# Patient Record
Sex: MALE | Race: WHITE | NOT HISPANIC OR LATINO | Employment: OTHER | URBAN - METROPOLITAN AREA
[De-identification: names, ages, dates, MRNs, and addresses within clinical notes are randomized per-mention and may not be internally consistent; named-entity substitution may affect disease eponyms.]

---

## 2017-05-20 ENCOUNTER — APPOINTMENT (OUTPATIENT)
Dept: RADIOLOGY | Facility: MEDICAL CENTER | Age: 67
End: 2017-05-20
Attending: INTERNAL MEDICINE
Payer: MEDICARE

## 2017-05-20 ENCOUNTER — HOSPITAL ENCOUNTER (OUTPATIENT)
Facility: MEDICAL CENTER | Age: 67
End: 2017-05-20
Attending: INTERNAL MEDICINE | Admitting: INTERNAL MEDICINE
Payer: MEDICARE

## 2017-05-20 ENCOUNTER — RESOLUTE PROFESSIONAL BILLING HOSPITAL PROF FEE (OUTPATIENT)
Dept: HOSPITALIST | Facility: MEDICAL CENTER | Age: 67
End: 2017-05-20
Payer: MEDICARE

## 2017-05-20 VITALS
RESPIRATION RATE: 18 BRPM | TEMPERATURE: 96.7 F | OXYGEN SATURATION: 98 % | SYSTOLIC BLOOD PRESSURE: 138 MMHG | DIASTOLIC BLOOD PRESSURE: 77 MMHG | WEIGHT: 296.52 LBS | HEIGHT: 68 IN | BODY MASS INDEX: 44.94 KG/M2 | HEART RATE: 60 BPM

## 2017-05-20 PROBLEM — R07.9 CHEST PAIN: Status: RESOLVED | Noted: 2017-05-20 | Resolved: 2017-05-20

## 2017-05-20 PROBLEM — I25.10 CAD (CORONARY ARTERY DISEASE): Status: ACTIVE | Noted: 2017-05-20

## 2017-05-20 PROBLEM — R07.9 CHEST PAIN: Status: ACTIVE | Noted: 2017-05-20

## 2017-05-20 LAB
ALBUMIN SERPL BCP-MCNC: 3.6 G/DL (ref 3.2–4.9)
ALBUMIN/GLOB SERPL: 1.3 G/DL
ALP SERPL-CCNC: 75 U/L (ref 30–99)
ALT SERPL-CCNC: 12 U/L (ref 2–50)
ANION GAP SERPL CALC-SCNC: 8 MMOL/L (ref 0–11.9)
AST SERPL-CCNC: 14 U/L (ref 12–45)
BASOPHILS # BLD AUTO: 0.6 % (ref 0–1.8)
BASOPHILS # BLD: 0.04 K/UL (ref 0–0.12)
BILIRUB SERPL-MCNC: 0.8 MG/DL (ref 0.1–1.5)
BUN SERPL-MCNC: 21 MG/DL (ref 8–22)
CALCIUM SERPL-MCNC: 9.3 MG/DL (ref 8.5–10.5)
CHLORIDE SERPL-SCNC: 105 MMOL/L (ref 96–112)
CO2 SERPL-SCNC: 23 MMOL/L (ref 20–33)
CREAT SERPL-MCNC: 0.73 MG/DL (ref 0.5–1.4)
CRP SERPL HS-MCNC: 4.16 MG/DL (ref 0–0.75)
EKG IMPRESSION: NORMAL
EOSINOPHIL # BLD AUTO: 0.42 K/UL (ref 0–0.51)
EOSINOPHIL NFR BLD: 6.1 % (ref 0–6.9)
ERYTHROCYTE [DISTWIDTH] IN BLOOD BY AUTOMATED COUNT: 51.2 FL (ref 35.9–50)
EST. AVERAGE GLUCOSE BLD GHB EST-MCNC: 137 MG/DL
GFR SERPL CREATININE-BSD FRML MDRD: >60 ML/MIN/1.73 M 2
GLOBULIN SER CALC-MCNC: 2.7 G/DL (ref 1.9–3.5)
GLUCOSE BLD-MCNC: 105 MG/DL (ref 65–99)
GLUCOSE BLD-MCNC: 126 MG/DL (ref 65–99)
GLUCOSE SERPL-MCNC: 124 MG/DL (ref 65–99)
HBA1C MFR BLD: 6.4 % (ref 0–5.6)
HCT VFR BLD AUTO: 43.4 % (ref 42–52)
HGB BLD-MCNC: 13.9 G/DL (ref 14–18)
IMM GRANULOCYTES # BLD AUTO: 0.02 K/UL (ref 0–0.11)
IMM GRANULOCYTES NFR BLD AUTO: 0.3 % (ref 0–0.9)
LYMPHOCYTES # BLD AUTO: 1.05 K/UL (ref 1–4.8)
LYMPHOCYTES NFR BLD: 15.2 % (ref 22–41)
MCH RBC QN AUTO: 30.3 PG (ref 27–33)
MCHC RBC AUTO-ENTMCNC: 32 G/DL (ref 33.7–35.3)
MCV RBC AUTO: 94.6 FL (ref 81.4–97.8)
MONOCYTES # BLD AUTO: 0.43 K/UL (ref 0–0.85)
MONOCYTES NFR BLD AUTO: 6.2 % (ref 0–13.4)
NEUTROPHILS # BLD AUTO: 4.96 K/UL (ref 1.82–7.42)
NEUTROPHILS NFR BLD: 71.6 % (ref 44–72)
NRBC # BLD AUTO: 0 K/UL
NRBC BLD AUTO-RTO: 0 /100 WBC
PLATELET # BLD AUTO: 114 K/UL (ref 164–446)
PMV BLD AUTO: 10.9 FL (ref 9–12.9)
POTASSIUM SERPL-SCNC: 3.4 MMOL/L (ref 3.6–5.5)
PROT SERPL-MCNC: 6.3 G/DL (ref 6–8.2)
RBC # BLD AUTO: 4.59 M/UL (ref 4.7–6.1)
SODIUM SERPL-SCNC: 136 MMOL/L (ref 135–145)
TROPONIN I SERPL-MCNC: 0.06 NG/ML (ref 0–0.04)
TSH SERPL DL<=0.005 MIU/L-ACNC: 3.44 UIU/ML (ref 0.3–3.7)
WBC # BLD AUTO: 6.9 K/UL (ref 4.8–10.8)

## 2017-05-20 PROCEDURE — 83036 HEMOGLOBIN GLYCOSYLATED A1C: CPT

## 2017-05-20 PROCEDURE — 93010 ELECTROCARDIOGRAM REPORT: CPT | Mod: 76 | Performed by: INTERNAL MEDICINE

## 2017-05-20 PROCEDURE — 82962 GLUCOSE BLOOD TEST: CPT | Mod: 91

## 2017-05-20 PROCEDURE — 700111 HCHG RX REV CODE 636 W/ 250 OVERRIDE (IP): Performed by: INTERNAL MEDICINE

## 2017-05-20 PROCEDURE — G8987 SELF CARE CURRENT STATUS: HCPCS | Mod: CH

## 2017-05-20 PROCEDURE — 84443 ASSAY THYROID STIM HORMONE: CPT

## 2017-05-20 PROCEDURE — 85025 COMPLETE CBC W/AUTO DIFF WBC: CPT

## 2017-05-20 PROCEDURE — 99236 HOSP IP/OBS SAME DATE HI 85: CPT | Performed by: INTERNAL MEDICINE

## 2017-05-20 PROCEDURE — 93005 ELECTROCARDIOGRAM TRACING: CPT | Mod: XU | Performed by: INTERNAL MEDICINE

## 2017-05-20 PROCEDURE — 97165 OT EVAL LOW COMPLEX 30 MIN: CPT

## 2017-05-20 PROCEDURE — 96361 HYDRATE IV INFUSION ADD-ON: CPT

## 2017-05-20 PROCEDURE — 80053 COMPREHEN METABOLIC PANEL: CPT

## 2017-05-20 PROCEDURE — G8989 SELF CARE D/C STATUS: HCPCS | Mod: CH

## 2017-05-20 PROCEDURE — 700111 HCHG RX REV CODE 636 W/ 250 OVERRIDE (IP)

## 2017-05-20 PROCEDURE — 93010 ELECTROCARDIOGRAM REPORT: CPT | Performed by: INTERNAL MEDICINE

## 2017-05-20 PROCEDURE — A9502 TC99M TETROFOSMIN: HCPCS

## 2017-05-20 PROCEDURE — 96360 HYDRATION IV INFUSION INIT: CPT

## 2017-05-20 PROCEDURE — G0378 HOSPITAL OBSERVATION PER HR: HCPCS

## 2017-05-20 PROCEDURE — 700105 HCHG RX REV CODE 258: Performed by: INTERNAL MEDICINE

## 2017-05-20 PROCEDURE — A9270 NON-COVERED ITEM OR SERVICE: HCPCS | Performed by: NURSE PRACTITIONER

## 2017-05-20 PROCEDURE — 700102 HCHG RX REV CODE 250 W/ 637 OVERRIDE(OP): Performed by: NURSE PRACTITIONER

## 2017-05-20 PROCEDURE — 84484 ASSAY OF TROPONIN QUANT: CPT | Mod: 91

## 2017-05-20 PROCEDURE — 36415 COLL VENOUS BLD VENIPUNCTURE: CPT

## 2017-05-20 PROCEDURE — G8988 SELF CARE GOAL STATUS: HCPCS | Mod: CH

## 2017-05-20 PROCEDURE — A9270 NON-COVERED ITEM OR SERVICE: HCPCS | Performed by: INTERNAL MEDICINE

## 2017-05-20 PROCEDURE — 86140 C-REACTIVE PROTEIN: CPT

## 2017-05-20 PROCEDURE — 700102 HCHG RX REV CODE 250 W/ 637 OVERRIDE(OP): Performed by: INTERNAL MEDICINE

## 2017-05-20 PROCEDURE — 96372 THER/PROPH/DIAG INJ SC/IM: CPT

## 2017-05-20 RX ORDER — AMOXICILLIN 250 MG
2 CAPSULE ORAL 2 TIMES DAILY
Status: DISCONTINUED | OUTPATIENT
Start: 2017-05-20 | End: 2017-05-20 | Stop reason: HOSPADM

## 2017-05-20 RX ORDER — POLYETHYLENE GLYCOL 3350 17 G/17G
1 POWDER, FOR SOLUTION ORAL
Status: DISCONTINUED | OUTPATIENT
Start: 2017-05-20 | End: 2017-05-20 | Stop reason: HOSPADM

## 2017-05-20 RX ORDER — PNV NO.95/FERROUS FUM/FOLIC AC 28MG-0.8MG
1 TABLET ORAL 2 TIMES DAILY
COMMUNITY

## 2017-05-20 RX ORDER — BISACODYL 10 MG
10 SUPPOSITORY, RECTAL RECTAL
Status: DISCONTINUED | OUTPATIENT
Start: 2017-05-20 | End: 2017-05-20 | Stop reason: HOSPADM

## 2017-05-20 RX ORDER — ALLOPURINOL 300 MG/1
300 TABLET ORAL DAILY
COMMUNITY

## 2017-05-20 RX ORDER — ATORVASTATIN CALCIUM 20 MG/1
20 TABLET, FILM COATED ORAL DAILY
COMMUNITY

## 2017-05-20 RX ORDER — OXYCODONE HYDROCHLORIDE 5 MG/1
2.5 TABLET ORAL
Status: DISCONTINUED | OUTPATIENT
Start: 2017-05-20 | End: 2017-05-20 | Stop reason: HOSPADM

## 2017-05-20 RX ORDER — FUROSEMIDE 20 MG/1
20 TABLET ORAL DAILY
Status: DISCONTINUED | OUTPATIENT
Start: 2017-05-20 | End: 2017-05-20 | Stop reason: HOSPADM

## 2017-05-20 RX ORDER — FOLIC ACID 1 MG/1
800 TABLET ORAL 2 TIMES DAILY
COMMUNITY

## 2017-05-20 RX ORDER — MAGNESIUM GLUCONATE 30 MG(550)
99 TABLET ORAL 2 TIMES DAILY
COMMUNITY

## 2017-05-20 RX ORDER — ONDANSETRON 4 MG/1
4 TABLET, ORALLY DISINTEGRATING ORAL EVERY 4 HOURS PRN
Status: DISCONTINUED | OUTPATIENT
Start: 2017-05-20 | End: 2017-05-20 | Stop reason: HOSPADM

## 2017-05-20 RX ORDER — GLUCOSAM/CHONDRO/HERB 149/HYAL 750-100 MG
1 TABLET ORAL
COMMUNITY

## 2017-05-20 RX ORDER — LOSARTAN POTASSIUM 50 MG/1
50 TABLET ORAL DAILY
Status: DISCONTINUED | OUTPATIENT
Start: 2017-05-20 | End: 2017-05-20 | Stop reason: HOSPADM

## 2017-05-20 RX ORDER — REGADENOSON 0.08 MG/ML
INJECTION, SOLUTION INTRAVENOUS
Status: COMPLETED
Start: 2017-05-20 | End: 2017-05-20

## 2017-05-20 RX ORDER — POTASSIUM CHLORIDE 20 MEQ/1
40 TABLET, EXTENDED RELEASE ORAL ONCE
Status: COMPLETED | OUTPATIENT
Start: 2017-05-20 | End: 2017-05-20

## 2017-05-20 RX ORDER — MAGNESIUM GLUCONATE 30 MG(550)
99 TABLET ORAL 2 TIMES DAILY
Status: DISCONTINUED | OUTPATIENT
Start: 2017-05-20 | End: 2017-05-20

## 2017-05-20 RX ORDER — DABIGATRAN ETEXILATE 150 MG/1
150 CAPSULE ORAL 2 TIMES DAILY
COMMUNITY

## 2017-05-20 RX ORDER — NYSTATIN 100000 [USP'U]/G
POWDER TOPICAL 2 TIMES DAILY
Status: DISCONTINUED | OUTPATIENT
Start: 2017-05-20 | End: 2017-05-20 | Stop reason: HOSPADM

## 2017-05-20 RX ORDER — VITAMIN E 268 MG
400 CAPSULE ORAL DAILY
COMMUNITY

## 2017-05-20 RX ORDER — AMOXICILLIN 250 MG
2 CAPSULE ORAL 3 TIMES DAILY
COMMUNITY

## 2017-05-20 RX ORDER — DEXTROSE MONOHYDRATE 25 G/50ML
25 INJECTION, SOLUTION INTRAVENOUS
Status: DISCONTINUED | OUTPATIENT
Start: 2017-05-20 | End: 2017-05-20 | Stop reason: HOSPADM

## 2017-05-20 RX ORDER — SPIRONOLACTONE 50 MG/1
25 TABLET, FILM COATED ORAL DAILY
Status: DISCONTINUED | OUTPATIENT
Start: 2017-05-20 | End: 2017-05-20 | Stop reason: HOSPADM

## 2017-05-20 RX ORDER — MAGNESIUM OXIDE 400 MG/1
250 TABLET ORAL DAILY
COMMUNITY

## 2017-05-20 RX ORDER — ATORVASTATIN CALCIUM 20 MG/1
20 TABLET, FILM COATED ORAL DAILY
Status: DISCONTINUED | OUTPATIENT
Start: 2017-05-20 | End: 2017-05-20 | Stop reason: HOSPADM

## 2017-05-20 RX ORDER — FUROSEMIDE 20 MG/1
20 TABLET ORAL DAILY
COMMUNITY

## 2017-05-20 RX ORDER — ALBUTEROL SULFATE 90 UG/1
2 AEROSOL, METERED RESPIRATORY (INHALATION) EVERY 4 HOURS PRN
Status: DISCONTINUED | OUTPATIENT
Start: 2017-05-20 | End: 2017-05-20 | Stop reason: HOSPADM

## 2017-05-20 RX ORDER — SODIUM CHLORIDE 9 MG/ML
INJECTION, SOLUTION INTRAVENOUS CONTINUOUS
Status: DISCONTINUED | OUTPATIENT
Start: 2017-05-20 | End: 2017-05-20

## 2017-05-20 RX ORDER — HEPARIN SODIUM 5000 [USP'U]/ML
5000 INJECTION, SOLUTION INTRAVENOUS; SUBCUTANEOUS EVERY 8 HOURS
Status: DISCONTINUED | OUTPATIENT
Start: 2017-05-20 | End: 2017-05-20

## 2017-05-20 RX ORDER — ALLOPURINOL 100 MG/1
300 TABLET ORAL DAILY
Status: DISCONTINUED | OUTPATIENT
Start: 2017-05-20 | End: 2017-05-20 | Stop reason: HOSPADM

## 2017-05-20 RX ORDER — ACETAMINOPHEN 325 MG/1
650 TABLET ORAL EVERY 6 HOURS PRN
Status: DISCONTINUED | OUTPATIENT
Start: 2017-05-20 | End: 2017-05-20 | Stop reason: HOSPADM

## 2017-05-20 RX ORDER — MORPHINE SULFATE 4 MG/ML
2 INJECTION, SOLUTION INTRAMUSCULAR; INTRAVENOUS
Status: DISCONTINUED | OUTPATIENT
Start: 2017-05-20 | End: 2017-05-20 | Stop reason: HOSPADM

## 2017-05-20 RX ORDER — SPIRONOLACTONE 25 MG/1
25 TABLET ORAL DAILY
COMMUNITY

## 2017-05-20 RX ORDER — LOSARTAN POTASSIUM 50 MG/1
50 TABLET ORAL DAILY
COMMUNITY

## 2017-05-20 RX ORDER — CHOLECALCIFEROL (VITAMIN D3) 125 MCG
1500 CAPSULE ORAL 2 TIMES DAILY
COMMUNITY

## 2017-05-20 RX ORDER — OXYCODONE HYDROCHLORIDE 5 MG/1
5 TABLET ORAL
Status: DISCONTINUED | OUTPATIENT
Start: 2017-05-20 | End: 2017-05-20 | Stop reason: HOSPADM

## 2017-05-20 RX ORDER — ONDANSETRON 2 MG/ML
4 INJECTION INTRAMUSCULAR; INTRAVENOUS EVERY 4 HOURS PRN
Status: DISCONTINUED | OUTPATIENT
Start: 2017-05-20 | End: 2017-05-20 | Stop reason: HOSPADM

## 2017-05-20 RX ORDER — AMOXICILLIN 250 MG
2 CAPSULE ORAL 3 TIMES DAILY
Status: DISCONTINUED | OUTPATIENT
Start: 2017-05-20 | End: 2017-05-20

## 2017-05-20 RX ORDER — LABETALOL HYDROCHLORIDE 5 MG/ML
10 INJECTION, SOLUTION INTRAVENOUS EVERY 4 HOURS PRN
Status: DISCONTINUED | OUTPATIENT
Start: 2017-05-20 | End: 2017-05-20 | Stop reason: HOSPADM

## 2017-05-20 RX ADMIN — SPIRONOLACTONE 25 MG: 50 TABLET ORAL at 08:20

## 2017-05-20 RX ADMIN — REGADENOSON 0.4 MG: 0.08 INJECTION, SOLUTION INTRAVENOUS at 15:21

## 2017-05-20 RX ADMIN — SODIUM CHLORIDE: 9 INJECTION, SOLUTION INTRAVENOUS at 05:02

## 2017-05-20 RX ADMIN — ATORVASTATIN CALCIUM 20 MG: 20 TABLET, FILM COATED ORAL at 08:20

## 2017-05-20 RX ADMIN — HEPARIN SODIUM 5000 UNITS: 5000 INJECTION, SOLUTION INTRAVENOUS; SUBCUTANEOUS at 06:30

## 2017-05-20 RX ADMIN — NYSTATIN 1500000 UNITS: 100000 POWDER TOPICAL at 12:31

## 2017-05-20 RX ADMIN — LOSARTAN POTASSIUM 50 MG: 50 TABLET, FILM COATED ORAL at 08:20

## 2017-05-20 RX ADMIN — VITAMIN D, TAB 1000IU (100/BT) 1000 UNITS: 25 TAB at 08:20

## 2017-05-20 RX ADMIN — ENOXAPARIN SODIUM 150 MG: 150 INJECTION SUBCUTANEOUS at 08:33

## 2017-05-20 RX ADMIN — FUROSEMIDE 20 MG: 20 TABLET ORAL at 08:20

## 2017-05-20 RX ADMIN — STANDARDIZED SENNA CONCENTRATE AND DOCUSATE SODIUM 2 TABLET: 8.6; 5 TABLET, FILM COATED ORAL at 08:21

## 2017-05-20 RX ADMIN — POTASSIUM CHLORIDE 40 MEQ: 1500 TABLET, EXTENDED RELEASE ORAL at 08:31

## 2017-05-20 RX ADMIN — MAGNESIUM GLUCONATE 500 MG ORAL TABLET 200 MG: 500 TABLET ORAL at 08:31

## 2017-05-20 RX ADMIN — ASPIRIN 81 MG: 81 TABLET ORAL at 08:19

## 2017-05-20 ASSESSMENT — PAIN SCALES - GENERAL
PAINLEVEL_OUTOF10: 3
PAINLEVEL_OUTOF10: 2

## 2017-05-20 ASSESSMENT — COPD QUESTIONNAIRES
COPD SCREENING SCORE: 7
HAVE YOU SMOKED AT LEAST 100 CIGARETTES IN YOUR ENTIRE LIFE: YES
DURING THE PAST 4 WEEKS HOW MUCH DID YOU FEEL SHORT OF BREATH: SOME OF THE TIME
DO YOU EVER COUGH UP ANY MUCUS OR PHLEGM?: YES, A FEW DAYS A WEEK OR MONTH

## 2017-05-20 ASSESSMENT — COGNITIVE AND FUNCTIONAL STATUS - GENERAL
DAILY ACTIVITIY SCORE: 24
SUGGESTED CMS G CODE MODIFIER DAILY ACTIVITY: CH

## 2017-05-20 ASSESSMENT — LIFESTYLE VARIABLES
ALCOHOL_USE: NO
EVER_SMOKED: YES

## 2017-05-20 ASSESSMENT — ACTIVITIES OF DAILY LIVING (ADL): TOILETING: INDEPENDENT

## 2017-05-20 NOTE — IP AVS SNAPSHOT
" Home Care Instructions                                                                                                                  Name:Segundo Tran  Medical Record Number:[id number]  CSN: [id number]    YOB: 1950   Age: 67 y.o.  Sex: male  HT:1.727 m (5' 8\") WT: 134.5 kg (296 lb 8.3 oz)          Admit Date: 5/20/2017     Discharge Date:   Today's Date: 5/20/2017  Attending Doctor:  Daniel Campos D.O.                  Allergies:  Lisinopril            Discharge Instructions       Discharge Instructions    Discharged to home by taxi with self. Discharged via wheelchair, hospital escort: Yes.  Special equipment needed: Not Applicable    Be sure to schedule a follow-up appointment with your primary care doctor or any specialists as instructed.     Discharge Plan:   Diet Plan: Discussed  Activity Level: Discussed  Confirmed Symptoms Management: Discussed  Medication Reconciliation Updated: Yes  Influenza Vaccine Indication: Not indicated: Previously immunized this influenza season and > 8 years of age    I understand that a diet low in cholesterol, fat, and sodium is recommended for good health. Unless I have been given specific instructions below for another diet, I accept this instruction as my diet prescription.   Other diet: Diabetic Diet    Special Instructions: None    · Is patient discharged on Warfarin / Coumadin?   No     · Is patient Post Blood Transfusion?  No    Depression / Suicide Risk    As you are discharged from this [facility] facility, it is important to learn how to keep safe from harming yourself.    Recognize the warning signs:  · Abrupt changes in personality, positive or negative- including increase in energy   · Giving away possessions  · Change in eating patterns- significant weight changes-  positive or negative  · Change in sleeping patterns- unable to sleep or sleeping all the time   · Unwillingness or inability to communicate  · Depression  · Unusual sadness, " discouragement and loneliness  · Talk of wanting to die  · Neglect of personal appearance   · Rebelliousness- reckless behavior  · Withdrawal from people/activities they love  · Confusion- inability to concentrate     If you or a loved one observes any of these behaviors or has concerns about self-harm, here's what you can do:  · Talk about it- your feelings and reasons for harming yourself  · Remove any means that you might use to hurt yourself (examples: pills, rope, extension cords, firearm)  · Get professional help from the community (Mental Health, Substance Abuse, psychological counseling)  · Do not be alone:Call your Safe Contact- someone whom you trust who will be there for you.  · Call your local CRISIS HOTLINE 914-1824 or 609-974-0728  · Call your local Children's Mobile Crisis Response Team [place] (354) 200-8631 or www.[employer]  · Call the toll free National Suicide Prevention Hotlines   · National Suicide Prevention Lifeline 826-073-XYMC (3697)  · [place] Hope Line Network 800-SUICIDE (533-7376)           Discharge Medication Instructions:    Below are the medications your physician expects you to take upon discharge:    Review all your home medications and newly ordered medications with your doctor and/or pharmacist. Follow medication instructions as directed by your doctor and/or pharmacist.    Please keep your medication list with you and share with your physician.               Medication List      CONTINUE taking these medications        Instructions    Morning Afternoon Evening Bedtime    allopurinol 300 MG Tabs   Commonly known as:  ZYLOPRIM        Take 300 mg by mouth every day.   Dose:  300 mg                        aspirin EC 81 MG Tbec   Last time this was given:  81 mg on 5/20/2017  8:19 AM   Commonly known as:  ECOTRIN        Take 81 mg by mouth every day.   Dose:  81 mg                        atorvastatin 20 MG Tabs   Last time this was given:  20 mg on 5/20/2017  8:20 AM      Commonly known as:  LIPITOR        Take 20 mg by mouth every day.   Dose:  20 mg                        cyanocobalamin 500 MCG Tabs   Commonly known as:  VITAMIN B-12        Take 1,500 mcg by mouth 2 times a day.   Dose:  1500 mcg                        dabigatran 150 MG Caps capsule   Commonly known as:  PRADAXA        Take 150 mg by mouth 2 Times a Day.   Dose:  150 mg                        folic acid 1 MG Tabs   Commonly known as:  FOLVITE        Take 800 mcg by mouth 2 times a day.   Dose:  800 mcg                        furosemide 20 MG Tabs   Last time this was given:  20 mg on 5/20/2017  8:20 AM   Commonly known as:  LASIX        Take 20 mg by mouth every day.   Dose:  20 mg                        * GINSENG COMPLEX PO        Take 2 tablet by mouth every day.   Dose:  2 tablet                        * GLUCOSAMINE CHOND COMPLEX/MSM Tabs        Take 1 Tab by mouth.   Dose:  1 Tab                        GLUCOSAMINE & FISH OIL PO        Take 1 Capsule by mouth 2 Times a Day. Indications: OSteo restore   Dose:  1 Capsule                        Iron 325 (65 FE) MG Tabs        Take 1 Tab by mouth 2 Times a Day.   Dose:  1 Tab                        losartan 50 MG Tabs   Last time this was given:  50 mg on 5/20/2017  8:20 AM   Commonly known as:  COZAAR        Take 50 mg by mouth every day.   Dose:  50 mg                        magnesium oxide 400 MG Tabs   Commonly known as:  MAG-OX        Take 250 mg by mouth every day.   Dose:  250 mg                        metformin 850 MG Tabs   Commonly known as:  GLUCOPHAGE        Take 850 mg by mouth 2 times a day, with meals.   Dose:  850 mg                        Potassium Gluconate 595 (99 K) MG Tabs        Take 99 mg by mouth 2 times a day.   Dose:  99 mg                        senna-docusate 8.6-50 MG Tabs   Last time this was given:  2 Tabs on 5/20/2017  8:21 AM   Commonly known as:  PERICOLACE or SENOKOT S        Take 2 Tabs by mouth 3 times a day.   Dose:  2 Tab                         spironolactone 25 MG Tabs   Last time this was given:  25 mg on 5/20/2017  8:20 AM   Commonly known as:  ALDACTONE        Take 25 mg by mouth every day. Indications: Heart Failure   Dose:  25 mg                        vitamin D 1000 UNIT Tabs   Last time this was given:  1,000 Units on 5/20/2017  8:20 AM   Commonly known as:  cholecalciferol        Take 1,000 Units by mouth every day.   Dose:  1000 Units                        vitamin e 400 UNIT Caps   Commonly known as:  VITAMIN E        Take 400 Units by mouth every day.   Dose:  400 Units                        * Notice:  This list has 2 medication(s) that are the same as other medications prescribed for you. Read the directions carefully, and ask your doctor or other care provider to review them with you.            Instructions           Diet / Nutrition:    Follow any diet instructions given to you by your doctor or the dietician, including how much salt (sodium) you are allowed each day.    If you are overweight, talk to your doctor about a weight reduction plan.    Activity:    Remain physically active following your doctor's instructions about exercise and activity.    Rest often.     Any time you become even a little tired or short of breath, SIT DOWN and rest.    Worsening Symptoms:    Report any of the following signs and symptoms to the doctor's office immediately:    *Pain of jaw, arm, or neck  *Chest pain not relieved by medication                               *Dizziness or loss of consciousness  *Difficulty breathing even when at rest   *More tired than usual                                       *Bleeding drainage or swelling of surgical site  *Swelling of feet, ankles, legs or stomach                 *Fever (>100ºF)  *Pink or blood tinged sputum  *Weight gain (3lbs/day or 5lbs /week)           *Shock from internal defibrillator (if applicable)  *Palpitations or irregular heartbeats                *Cool and/or numb  extremities    Stroke Awareness    Common Risk Factors for Stroke include:    Age  Atrial Fibrillation  Carotid Artery Stenosis  Diabetes Mellitus  Excessive alcohol consumption  High blood pressure  Overweight   Physical inactivity  Smoking    Warning signs and symptoms of a stroke include:    *Sudden numbness or weakness of the face, arm or leg (especially on one side of the body).  *Sudden confusion, trouble speaking or understanding.  *Sudden trouble seeing in one or both eyes.  *Sudden trouble walking, dizziness, loss of balance or coordination.Sudden severe headache with no known cause.    It is very important to get treatment quickly when a stroke occurs. If you experience any of the above warning signs, call 911 immediately.                   Disclaimer         Quit Smoking / Tobacco Use:    I understand the use of any tobacco products increases my chance of suffering from future heart disease or stroke and could cause other illnesses which may shorten my life. Quitting the use of tobacco products is the single most important thing I can do to improve my health. For further information on smoking / tobacco cessation call a Toll Free Quit Line at 1-756.960.1993 (*[employer] [place]) or 1-620.800.1432 (American Lung Association) or you can access the web based program at www.lungusa.org.    [place] Tobacco Users Help Line:  (473) 426-6166       Toll Free: 1-485.164.3868  Quit Tobacco Program [facility] (923)078-0287    Crisis Hotline:    [place] Crisis Hotline:  8-268-ZWJAOOB or 1-766.902.7379    [place] Crisis Hotline:    1-578.349.2621 or 497-642-9954    Discharge Survey:   Thank you for choosing [facility]. We hope we did everything we could to make your hospital stay a pleasant one. You may be receiving a phone survey and we would appreciate your time and participation in answering the questions. Your input is very valuable to us in our efforts to improve our service to our  patients and their families.        My signature on this form indicates that:    1. I have reviewed and understand the above information.  2. My questions regarding this information have been answered to my satisfaction.  3. I have formulated a plan with my discharge nurse to obtain my prescribed medications for home.                  Disclaimer         __________________________________                     __________       ________                       Patient Signature                                                 Date                    Time

## 2017-05-20 NOTE — PROGRESS NOTES
Report received, pt care assumed, tele box on. Pt assessment complete. Pt aaox4, no signs of distress noted at this time. POC discussed with pt and verbalizes no questions. Pt denies any additional needs at this time. Bed in lowest position and locked. Pt educated on fall risk and verbalized understanding. Call light and personal belongings within reach. Will continue to monitor.

## 2017-05-20 NOTE — PROGRESS NOTES
Medical records received from [place] [facility]:  ER report; Labs; CXR; EKG; and Facesheet.  Scanned into Media tab.

## 2017-05-20 NOTE — DISCHARGE SUMMARY
CHIEF COMPLAINT ON ADMISSION  Chest pain.    HPI & HOSPITAL COURSE  For a complete history and physical refer to the note posted by Dr. Rivera on 5/20/2017.  In summary, this is a 67 y.o. male here with chest pain.  The patient had a fall at home 2-3 weeks ago has been having left-sided chest discomfort since that time.  On admission, initial laboratory data revealed a troponin of 0.06, although the patient's troponin remained at this level throughout admission.  EKG was stable with St or T-wave changes.  Cardiac stress test was negative for inducible ischemia.  The patient's vital signs remained stable over admission, with complete resolution of his chest pain.  As the patient has remained stable and has no evidence of acute coronary syndrome, he will be discharged home at this time.    DISCHARGE PROBLEM LIST  1.  Chest pain - resolved.  2.  Dyslipidemia.  3.  Coronary artery disease.  4.  Hypertension.  5.  Diabetes Mellitus type 2.  6.  Congestive heart failure.  7.  Chronic atrial fibrillation.    FOLLOW UP  The patient is recommended to follow up with his PCP in 1-2 weeks after discharge.     MEDICATIONS ON DISCHARGE   Segundo Tran   Home Medication Instructions KELLY:[id number]    Printed on:05/20/17 1420   Medication Information                      allopurinol (ZYLOPRIM) 300 MG Tab  Take 300 mg by mouth every day.             aspirin EC (ECOTRIN) 81 MG Tablet Delayed Response  Take 81 mg by mouth every day.             atorvastatin (LIPITOR) 20 MG Tab  Take 20 mg by mouth every day.             cyanocobalamin (VITAMIN B-12) 500 MCG Tab  Take 1,500 mcg by mouth 2 times a day.             dabigatran (PRADAXA) 150 MG Cap capsule  Take 150 mg by mouth 2 Times a Day.             Ferrous Sulfate (IRON) 325 (65 FE) MG Tab  Take 1 Tab by mouth 2 Times a Day.             folic acid (FOLVITE) 1 MG Tab  Take 800 mcg by mouth 2 times a day.             furosemide (LASIX) 20 MG Tab  Take 20 mg by mouth every day.              Glucosamine-Fish Oil-EPA-DHA (GLUCOSAMINE & FISH OIL PO)  Take 1 Capsule by mouth 2 Times a Day. Indications: OSteo restore             losartan (COZAAR) 50 MG Tab  Take 50 mg by mouth every day.             magnesium oxide (MAG-OX) 400 MG Tab  Take 250 mg by mouth every day.             metformin (GLUCOPHAGE) 850 MG Tab  Take 850 mg by mouth 2 times a day, with meals.             Misc Natural Products (GINSENG COMPLEX PO)  Take 2 tablet by mouth every day.             Misc Natural Products (GLUCOSAMINE CHOND COMPLEX/MSM) Tab  Take 1 Tab by mouth.             Potassium Gluconate 595 (99 K) MG Tab  Take 99 mg by mouth 2 times a day.             senna-docusate (PERICOLACE OR SENOKOT S) 8.6-50 MG Tab  Take 2 Tabs by mouth 3 times a day.             spironolactone (ALDACTONE) 25 MG Tab  Take 25 mg by mouth every day. Indications: Heart Failure             vitamin D (CHOLECALCIFEROL) 1000 UNIT Tab  Take 1,000 Units by mouth every day.             vitamin e (VITAMIN E) 400 UNIT Cap  Take 400 Units by mouth every day.                 DIET  Diabetic diet.    ACTIVITY  As tolerated.      LABORATORY  Lab Results   Component Value Date/Time    SODIUM 136 05/20/2017 04:13 AM    POTASSIUM 3.4* 05/20/2017 04:13 AM    CHLORIDE 105 05/20/2017 04:13 AM    CO2 23 05/20/2017 04:13 AM    GLUCOSE 124* 05/20/2017 04:13 AM    BUN 21 05/20/2017 04:13 AM    CREATININE 0.73 05/20/2017 04:13 AM        Lab Results   Component Value Date/Time    WBC 6.9 05/20/2017 04:13 AM    HEMOGLOBIN 13.9* 05/20/2017 04:13 AM    HEMATOCRIT 43.4 05/20/2017 04:13 AM    PLATELET COUNT 114* 05/20/2017 04:13 AM        Total time of the discharge process was 39 minutes.

## 2017-05-20 NOTE — H&P
DATE OF ADMISSION:  05/20/2017    [facility]    [facility]    CHIEF COMPLAINT ON ADMISSION:  Chest pain.    HISTORY OF PRESENT ILLNESS:  Patient is a 67-year-old male, morbidly obese    male with known history of myocardial infarction in 2000, status   post stenting x3 with coronary artery disease, dyslipidemia, hypertension,   chronic atrial fibrillation on chronic anticoagulation with Pradaxa, as well   as diabetes, presents with complaints of chest pain x2 days.  Patient was   transferred from outlying facility with slightly elevated troponin of 0.06 x2.    Patient reports tripping and falling 2-3 weeks ago with associated   left-sided discomfort.  Patient states that this pain had seemed to be   improving.  Patient then had noted left-sided chest discomfort on Friday   morning that felt like when he had his myocardial infarction back in 2000.    Patient then presented to the emergency room for evaluation.  Patient was   given sublingual nitro with improvement of symptoms.    Patient denies any radiation of pain.  Describes the pain as discomfort.    Denies any associated nausea, vomiting, diaphoresis, shortness of breath,   cough, fevers, chills, diarrhea or dysuria.    On my evaluation, patient denies any further pain.    REVIEW OF SYSTEMS:  As per HPI.  All other systems are reviewed and negative.    PAST MEDICAL HISTORY:  Coronary artery disease, myocardial infarction in 2000,   chronic atrial fibrillation on Pradaxa, gout, diabetes mellitus, COPD,   arthritis, congestive heart failure, history of MRSA.    PAST SURGICAL HISTORY:  Includes cholecystectomy, ablation, cardiac stenting.    ALLERGIES:  LISINOPRIL.    FAMILY HISTORY:  Reviewed, is noncontributory to this presentation.    SOCIAL HISTORY:  Patient lives alone.  Denies any tobacco, alcohol or drug   use.    HOME MEDICATIONS:  Include:  1.  Allopurinol 300 mg daily.  2.  Aspirin 81 mg daily.  3.  Lipitor 20 mg  daily.  4.  Vitamin B12 1500 mcg b.i.d.  5.  Pradaxa 150 mg p.o. b.i.d.  6.  Ferrous sulfate 325 p.o. b.i.d.  7.  Folic acid 800 mcg p.o. b.i.d.  8.  Lasix 20 mg daily.  9.  Glucosamine and fish oil 1 capsule b.i.d.  10.  Cozaar 50 mg daily.  11.  Magnesium oxide 250 mg daily.  12.  Metformin 850 mg p.o. b.i.d.  13.  Potassium 99 mg p.o. b.i.d.  14.  Shanthi-Colace 2 tabs p.o. 3 times a day.  15.  Spironolactone 25 mg p.o. daily.  16.  Vitamin D 1000 units daily.  17.  Vitamin E daily.    PHYSICAL EXAMINATION:  VITAL SIGNS:  On admission, temperature is 35.8, pulse 76, respirations 17,   satting 98% on room air, blood pressure is 141/63.  GENERAL:  Patient is alert and oriented x4 in no acute distress.  HEENT:  Normocephalic, atraumatic.  Mucous membranes are moist.  Extraocular   muscles are intact.  NECK:  No JVD.  No thyromegaly.  CARDIOVASCULAR:  S1, S2 is irregularly irregular.  No murmurs noted.  RESPIRATORY:  Lungs are clear to auscultation bilaterally.  No crackles,   wheezes, or rales.  CHEST WALL:  Nontender to palpation, no masses noted.  BACK:  No flank tenderness to palpation.  ABDOMEN:  Bowel sounds positive, soft, nontender, nondistended.  EXTREMITIES:  No lower extremity edema.  Distal pulses palpable bilaterally.    No calf tenderness to palpation.  SKIN:  No ecchymosis or purpura.  PSYCHIATRIC:  Does not appear anxious or depressed.  NEUROLOGIC:  Cranial nerves are grossly intact.  Moving all extremities   spontaneously.  Muscle strength was 5/5 bilateral upper extremity and lower   extremity.    LABORATORY DATA:  On admission, white count 6.9, hemoglobin 13.9, MCV of 94%,   platelet of 114, segs of 71%.  Sodium 136, potassium 3.4, anion gap 8, glucose   124, BUN 21 gram 0.73.  LFTs are within normal limits.  Troponin again is   0.06.  TSH is 3.440.  CRP of 4.16.    IMAGING:  EKG shows atrial fibrillation, rate controlled.    ASSESSMENT AND PLAN:  Patient is a 67-year-old male with multiple    comorbidities including prior history of CAD, MI, diabetes, hypertension and   dyslipidemia, who presents with chest discomfort.  1.  Chest pain, rule out acute coronary syndrome.  Patient does have slightly   elevated troponin.  [facility]  Patient will   be kept n.p.o. for a stress test in the a.m.  We will consult cardiology as   needed.  We will continue aspirin.  We will place patient on full-dose Lovenox   b.i.d.  We will consult cardiology as needed.  2.  Dyslipidemia.  3.  We will continue statin and we will check a lipid profile.  4.  History of congestive heart failure, continue home medications.  5.  Hypertension.  6.  Type 2 diabetes mellitus.  We will hold oral hypoglycemics.  We will place   him on sliding scale insulin.  7.  Morbid obesity.  Advised diet and exercise.  8.  Recent fall with left-sided pain.  We will follow up PT, OT evaluation.  9.  Gastrointestinal and deep venous thrombosis prophylaxis.  Patient is   tolerating oral diet and will be placed on Lovenox.  10.  Code status:  Full code.    Patient will be admitted to the telemetry observation unit.  We anticipate   less than 2 midnights' stay.    Total admission time is 45 minutes.       ____________________________________     MENDEZ HOANG DO    EN / NTS    DD:  05/20/2017 06:35:08  DT:  05/20/2017 07:05:27    D#:  [id number]  Job#:  [id number]

## 2017-05-20 NOTE — PROGRESS NOTES
Pt is direct admit from [facility], admitted to room T215 via EMS in  A stretcher at 0400.  Pt aaox4 .Pt with mild chest discomfort and upper back pain.Pain reported at 3 on a scale of 0-10. Oriented to room call light .  Reviewed plan of care (equipment, medications, activity, diet, fall precautions, skin care, and pain) with patient .

## 2017-05-20 NOTE — PROGRESS NOTES
Direct admit from [facility] ([place] Dr. Rankin, [phone].  Accepted by Dr. Rivera for Chest pain.  Per RN report, chest pain 3/10 pain level.  Trop = 0.06 x2.  VS:  106/81 mmHg; 51 A-fib; 20 rr; 975 ra.  ADT signed & held @ 0122, needs to be released upon pt arrival.  No written orders received.  Pt coming by ground.

## 2017-05-20 NOTE — PROGRESS NOTES
Pt worried about how he will get home. Called [facility] case coordinator. Resources provided by [facility] and given to patient.

## 2017-05-21 LAB — EKG IMPRESSION: NORMAL

## 2017-05-21 NOTE — DISCHARGE INSTRUCTIONS
Discharge Instructions    Discharged to home by taxi with self. Discharged via wheelchair, hospital escort: Yes.  Special equipment needed: Not Applicable    Be sure to schedule a follow-up appointment with your primary care doctor or any specialists as instructed.     Discharge Plan:   Diet Plan: Discussed  Activity Level: Discussed  Confirmed Symptoms Management: Discussed  Medication Reconciliation Updated: Yes  Influenza Vaccine Indication: Not indicated: Previously immunized this influenza season and > 8 years of age    I understand that a diet low in cholesterol, fat, and sodium is recommended for good health. Unless I have been given specific instructions below for another diet, I accept this instruction as my diet prescription.   Other diet: Diabetic Diet    Special Instructions: None    · Is patient discharged on Warfarin / Coumadin?   No     · Is patient Post Blood Transfusion?  No    Depression / Suicide Risk    As you are discharged from this [facility] facility, it is important to learn how to keep safe from harming yourself.    Recognize the warning signs:  · Abrupt changes in personality, positive or negative- including increase in energy   · Giving away possessions  · Change in eating patterns- significant weight changes-  positive or negative  · Change in sleeping patterns- unable to sleep or sleeping all the time   · Unwillingness or inability to communicate  · Depression  · Unusual sadness, discouragement and loneliness  · Talk of wanting to die  · Neglect of personal appearance   · Rebelliousness- reckless behavior  · Withdrawal from people/activities they love  · Confusion- inability to concentrate     If you or a loved one observes any of these behaviors or has concerns about self-harm, here's what you can do:  · Talk about it- your feelings and reasons for harming yourself  · Remove any means that you might use to hurt yourself (examples: pills, rope, extension cords, firearm)  · Get  professional help from the community (Mental Health, Substance Abuse, psychological counseling)  · Do not be alone:Call your Safe Contact- someone whom you trust who will be there for you.  · Call your local CRISIS HOTLINE 543-6544 or 201-019-2138  · Call your local Children's Mobile Crisis Response Team [place] (349) 257-1392 or www.[employer]  · Call the toll free National Suicide Prevention Hotlines   · National Suicide Prevention Lifeline 774-572-TAWB (6525)  · [place] Hope Line Network 800-SUICIDE (773-8020)

## 2017-05-21 NOTE — THERAPY
"Occupational Therapy Evaluation completed.   Functional Status:  Independent and safe mobility and self-care  Plan of Care: Patient with no further skilled OT needs in the acute care setting at this time  Discharge Recommendations:  Equipment: No Equipment Needed. Post-acute therapy Currently anticipate no further skilled therapy needs once patient is discharged from the inpatient setting.    See \"Rehab Therapy-Acute\" Patient Summary Report for complete documentation.    "

## 2017-05-21 NOTE — PROGRESS NOTES
Discharge instructions, medications and follow-up reviewed with pt, pt verbalized understanding and denies questions. Discharge paperwork given to pt. PIV removed, TeleBox removed, armband removed. Pt transported to [facility] where shaka is waiting with hospital escort.

## 2020-09-17 ENCOUNTER — HOSPITAL ENCOUNTER (EMERGENCY)
Dept: HOSPITAL 8 - ED | Age: 70
Discharge: HOME | End: 2020-09-17
Payer: MEDICARE

## 2020-09-17 VITALS — BODY MASS INDEX: 39.18 KG/M2 | HEIGHT: 69 IN | WEIGHT: 264.55 LBS

## 2020-09-17 VITALS — DIASTOLIC BLOOD PRESSURE: 69 MMHG | SYSTOLIC BLOOD PRESSURE: 105 MMHG

## 2020-09-17 DIAGNOSIS — I11.0: ICD-10-CM
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
## 2020-09-17 DIAGNOSIS — I50.9: ICD-10-CM

## 2020-09-17 DIAGNOSIS — I48.91: ICD-10-CM

## 2020-09-17 DIAGNOSIS — I25.2: ICD-10-CM

## 2020-09-17 DIAGNOSIS — S30.0XXA: Primary | ICD-10-CM
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
## 2020-09-17 PROCEDURE — 93005 ELECTROCARDIOGRAM TRACING: CPT

## 2020-09-17 PROCEDURE — 72220 X-RAY EXAM SACRUM TAILBONE: CPT

## 2020-09-17 PROCEDURE — 99285 EMERGENCY DEPT VISIT HI MDM: CPT

## 2020-09-17 PROCEDURE — 72110 X-RAY EXAM L-2 SPINE 4/>VWS: CPT

## 2020-09-17 NOTE — NUR
BROUGHT IN BY LUCIANO FROM [facility] FOR GL[facility] LAST NIGHT, DENIES LOC. 
WOKE THIS MORNING WITH LBP. 

500 MG TYLENOL ADMINISTERED PTA BY EMS
